# Patient Record
(demographics unavailable — no encounter records)

---

## 2024-10-07 NOTE — DISCUSSION/SUMMARY
[FreeTextEntry1] : 36-year-old G [id number] last menstrual cycle was September 8, 2024 presents with positive pregnancy test.  Bedside sonogram failed to show definitive intrauterine gestation and there was no adnexal mass.  Physical examination is normal.  GC chlamydia sent and quantitative beta-hCG ordered.  Return in 2 weeks for follow-up.  Diet and exercise reviewed.  Safe sex practices reviewed.

## 2024-10-07 NOTE — PHYSICAL EXAM
[Chaperone Present] : A chaperone was present in the examining room during all aspects of the physical examination [[id number]] : A chaperone was present during the pelvic exam. [FreeTextEntry2] : jeny [Appropriately responsive] : appropriately responsive [Alert] : alert [No Acute Distress] : no acute distress [No Lymphadenopathy] : no lymphadenopathy [Regular Rate Rhythm] : regular rate rhythm [No Murmurs] : no murmurs [Clear to Auscultation B/L] : clear to auscultation bilaterally [Soft] : soft [Non-tender] : non-tender [Non-distended] : non-distended [No HSM] : No HSM [No Lesions] : no lesions [No Mass] : no mass [Oriented x3] : oriented x3 [Examination Of The Breasts] : a normal appearance [No Masses] : no breast masses were palpable [Labia Majora] : normal [Labia Minora] : normal [Normal] : normal [Uterine Adnexae] : normal [Declined] : Patient declined rectal exam [FreeTextEntry5] : cervix not visualized, history of cone biopsy.Pap smear taken nonetheless.

## 2024-10-07 NOTE — HISTORY OF PRESENT ILLNESS
[N] : Patient does not use contraception [Y] : Positive pregnancy history [Regular Cycle Intervals] : periods have been regular [Frequency: Q ___ days] : menstrual periods occur approximately every [unfilled] days [Menarche Age: ____] : age at menarche was [unfilled] [FreeTextEntry1] : 36-year-old -0-1-2 last menstrual cycle was 2024 presents with positive pregnancy test.  Bedside sonogram failed to show definitive intrauterine gestation but no adnexal masses .  Patient complains of breast tenderness. [PGHxTotal] : 4 [[facility]] : 2 [PGHxPremature] : 0 [PGHxAbortions] : 0 [[facility]Living] : 2 [PGHxABInduced] : 0 [PGHxABSpont] : 0 [PGxEctopic] : 1 [PGHxMultBirths] : 0

## 2024-10-25 NOTE — DISCUSSION/SUMMARY
[FreeTextEntry1] : 36-year-old -0-1-2 last menstrual cycle was 2024 presents with positive pregnancy test and bedside sonogram shows intrauterine gestation with fetal heart.  Repeat Pap smear sent.  Patient on prenatal vitamins.  Sonogram ordered for dating.  Return in 2 weeks for follow-up.  All questions answered.

## 2024-10-25 NOTE — PHYSICAL EXAM
[Chaperone Present] : A chaperone was present in the examining room during all aspects of the physical examination [[id number]] : A chaperone was present during the pelvic exam. [FreeTextEntry2] : Leah [Appropriately responsive] : appropriately responsive [Alert] : alert [No Acute Distress] : no acute distress [No Lymphadenopathy] : no lymphadenopathy [Regular Rate Rhythm] : regular rate rhythm [No Murmurs] : no murmurs [Clear to Auscultation B/L] : clear to auscultation bilaterally [Soft] : soft [Non-tender] : non-tender [Non-distended] : non-distended [No HSM] : No HSM [No Lesions] : no lesions [No Mass] : no mass [Oriented x3] : oriented x3 [Examination Of The Breasts] : a normal appearance [No Masses] : no breast masses were palpable [Labia Majora] : normal [Labia Minora] : normal [Normal] : normal [Enlarged ___ wks] : enlarged [unfilled] ~Uweeks [Uterine Adnexae] : normal [Declined] : Patient declined rectal exam [FreeTextEntry5] : cervix not visualized, history of cone biopsy.Pap smear taken nonetheless.

## 2024-10-25 NOTE — HISTORY OF PRESENT ILLNESS
[FreeTextEntry1] : 36-year-old -0-1-2 last menstrual cycle was 2024 presents with positive pregnancy test and bedside sonogram shows intrauterine gestation with fetal heart.  No nausea vomiting or diarrhea.

## 2024-11-11 NOTE — REASON FOR VISIT
[FreeTextEntry1] : [facility] [place]. [phone] [address] [place] [address]

## 2024-11-11 NOTE — PROCEDURE
[Colposcopy] : colposcopy [Abnormal Pap] : an abnormal pap smear [Patient] : the patient [Verbal Consent] : verbal consent was obtained prior to the procedure and is detailed in the patient's record [No Abnormalities] : no abnormalities [Normal Staining] : normal staining [Biopsies Taken: # ___] : no biopsies were taken of the cervix [ECC Done] : an endocervical curettage was not performed [No Complications] : none [Tolerated Well] : the patient tolerated the procedure well [FreeTextEntry1] : No acetowhite changes no leukoplakia, no concern for dysplasia, normal exam. Transformation zone not clearly seen, but given pregnancy no ECC done. Low concern for high grade dysplasia. No atypical vessels seen. Recommend follow up after delivery.

## 2024-11-11 NOTE — HISTORY OF PRESENT ILLNESS
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 Hilliard 12/23 and she presented to discuss results. ECC and 3 o'clock biopsy with no evidence of dysplasia. She was last seen in January 2024 and I recommended a1 year PAP follow up. Given low grade changes and discrepancy is not that concerning. If there is persistent low grade PAP over 2 years and no correlation will consider LEEP.  Patient presents to the office today for an abnormal pap smear revealed LGSIL HPV DETECTED, HPV 16/18/45 not detected.   Patient is currently @ 13 weeks pregnant. She reports no vaginal fluid or discharge and no bleeding.

## 2024-11-11 NOTE — END OF VISIT
[FreeTextEntry3] : TVUS at 16 weeks to measure cervical length Follow up after delivery for repeat colposcopy  Follow up with Dr. Hilliard for pregnancy [FreeTextEntry2] : Alexandrea Hines MA was present the entire duration of the patient interaction and gynecological exam.

## 2024-11-11 NOTE — ASSESSMENT
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 Hilliard 12/23 and she presented to discuss results. ECC and 3 o'clock biopsy with no evidence of dysplasia. She is now @13 weeks with normal colposcopy today.  Short cervix on exam today at 13 weeks but closed. Recommend TVUS at 16 weeks and follow up with her OB/Gyn.   Follow up after delivery and will perform repeat colposcopy and biopsies.

## 2024-11-11 NOTE — REASON FOR VISIT
[FreeTextEntry1] : [facility] [place]. [phone] [address] [place] [address]

## 2024-11-11 NOTE — PHYSICAL EXAM
[Chaperone Present] : A chaperone was present in the examining room during all aspects of the physical examination [[id number]] : A chaperone was present during the pelvic exam. [FreeTextEntry2] : Alexandrea Hines MA was present the entire duration of the patient interaction and gynecological exam. [Normal] : Anus and perineum: Normal sphincter tone, no masses, no prolapse. [de-identified] : soft, non-tender [de-identified] : see colposcopy,  [de-identified] : closed cervix, short on palpation [Fully active, able to carry on all pre-disease performance without restriction] : Status 0 - Fully active, able to carry on all pre-disease performance without restriction

## 2024-11-11 NOTE — PHYSICAL EXAM
[Chaperone Present] : A chaperone was present in the examining room during all aspects of the physical examination [[id number]] : A chaperone was present during the pelvic exam. [FreeTextEntry2] : Alexandrea Hines MA was present the entire duration of the patient interaction and gynecological exam. [Normal] : Anus and perineum: Normal sphincter tone, no masses, no prolapse. [de-identified] : soft, non-tender [de-identified] : see colposcopy,  [de-identified] : closed cervix, short on palpation [Fully active, able to carry on all pre-disease performance without restriction] : Status 0 - Fully active, able to carry on all pre-disease performance without restriction

## 2025-01-09 NOTE — CHIEF COMPLAINT
[G ___] : G [unfilled] [P ___] : P [unfilled] [de-identified] : AMA, prior  x2, hx of LEEP/ cold knife biopsy and history of  death

## 2025-01-09 NOTE — DISCUSSION/SUMMARY
[FreeTextEntry1] : We had the pleasure of seeing Ms. Trevin Kimball for a Maternal-Fetal Medicine consultation today. 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 This condition primarily arises when the fetus inherits specific platelet antigens from the father that the mother does not possess, most commonly human platelet antigen (HPA)-1a. As the mother's immune system reacts against these paternal antigens, the resulting antibodies cross the placenta, binding to the fetal platelets and leading to their premature clearance. This condition poses severe risks, particularly intracranial hemorrhage (ICH), which can have detrimental outcomes for the , ranging from developmental delays to death. Platelet antigen incompatibility confirmed during current pregnancy. Patient currently receiving intravenous immunoglobulin (IVIG) 1 g/kg/week. Starting today, we also recommend prednisone 0.5 mg/kg/day. Prescription sent to pharmacy for 20 mg PO prednisone daily. Discussed risks/benefits with patient, including increased risk for diabetes during pregnancy. Recommend glucose testing in 2-3 weeks. Pre-labor  delivery should be performed to minimize risks, the timing of which depends on the patient's clinical history.  History of fetus with congenital heart defect. Fetal echocardiogram normal.  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  History of prior CS x2:  Her first  del[place] She had a repeat  in 2017. A prelabor repeat  delivery is recommended to reduce the chance of uterine rupture. Timing of delivery TBD.  Recommendation: - Continue IVIG and start prednisone   At the end of our discussion, the patient indicated that her questions were answered, and she seemed satisfied with our discussion. All her questions were answered Please do not hesitate to contact us with any questions.   Sincerely, Dexter Trivedi MD, Attending, Maternal-Fetal Medicine

## 2025-02-13 NOTE — CHIEF COMPLAINT
[G ___] : G [unfilled] [P ___] : P [unfilled] [de-identified] : AMA, prior  x2, hx of LEEP/ cold knife biopsy and history of  death

## 2025-02-13 NOTE — DISCUSSION/SUMMARY
[FreeTextEntry1] : We had the pleasure of seeing Ms. Trevin Kimball for a Maternal-Fetal Medicine consultation today. 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 This condition primarily arises when the fetus inherits specific platelet antigens from the father that the mother does not possess, most commonly human platelet antigen (HPA)-1a. As the mother's immune system reacts against these paternal antigens, the resulting antibodies cross the placenta, binding to the fetal platelets and leading to their premature clearance. This condition poses severe risks, particularly intracranial hemorrhage (ICH), which can have detrimental outcomes for the , ranging from developmental delays to death. Platelet antigen incompatibility confirmed during current pregnancy. Patient currently receiving intravenous immunoglobulin (IVIG) 1 g/kg/week and prednisone 0.5 mg/kg/day. Starting on 25, IVIG infusions will change from weekly to twice weekly. Pre-labor  delivery should be performed to minimize risks, the timing of which depends on the patient's clinical history. Per recent discussion at multidisciplinary meeting, delivery timing window of 35-38 weeks considered, and current target is 36 weeks.  History of fetus with congenital heart defect. Fetal echocardiogram normal.  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  History of prior CS x2:  Her first  delivery was for arrest of dilation or failed [place] She had a repeat  in 2017. A prelabor repeat  delivery is recommended to reduce the chance of uterine rupture. See timing discussion above.  Recommendation: - Continue IVIG and prednisone, begin twice weekly IVIG infusions on 25 - Repeat 1-hour GCT (specimen lost)--patient at high risk for diabetes given steroid exposure   At the end of our discussion, the patient indicated that her questions were answered, and she seemed satisfied with our discussion. All her questions were answered Please do not hesitate to contact us with any questions.   Sincerely, Dexter Trivedi MD, Attending, Maternal-Fetal Medicine

## 2025-03-13 NOTE — CHIEF COMPLAINT
[G ___] : G [unfilled] [P ___] : P [unfilled] [de-identified] : AMA, prior  x2, hx of LEEP/ cold knife biopsy and history of  death

## 2025-03-13 NOTE — DISCUSSION/SUMMARY
[FreeTextEntry1] : We had the pleasure of seeing Ms. Trevin Kimball for a Maternal-Fetal Medicine consultation today. 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 This condition primarily arises when the fetus inherits specific platelet antigens from the father that the mother does not possess, most commonly human platelet antigen (HPA)-1a. As the mother's immune system reacts against these paternal antigens, the resulting antibodies cross the placenta, binding to the fetal platelets and leading to their premature clearance. This condition poses severe risks, particularly intracranial hemorrhage (ICH), which can have detrimental outcomes for the , ranging from developmental delays to death. Platelet antigen incompatibility confirmed during current pregnancy. Patient currently receiving intravenous immunoglobulin (IVIG) 1 g/kg/week and prednisone 0.5 mg/kg/day. Starting on 25, IVIG infusions will change from weekly to twice weekly. Pre-labor  delivery should be performed to minimize risks, the timing of which depends on the patient's clinical history. Per recent discussion at multidisciplinary meeting, delivery timing of 36 weeks is the goal. Consulation with elsa benjamin to be scheduled.   History of fetus with congenital heart defect. Fetal echocardiogram normal.  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  History of prior CS x2:  Her first  delivery was [place] of [place] She had a repeat  in 2017. A prelabor repeat  delivery is recommended to reduce the chance of uterine rupture. See timing discussion above.  Recommendation: - Continue prednisone and twice weekly IVIG infusions, which will start occuring on L&D next week   At the end of our discussion, the patient indicated that her questions were answered, and she seemed satisfied with our discussion. All her questions were answered Please do not hesitate to contact us with any questions.   Sincerely, Dexter Trivedi MD, Attending, Maternal-Fetal Medicine

## 2025-04-03 NOTE — LETTER GREETING
Dear  ___] : Dear  [unfilled], [I had the pleasure of seeing your patient today.] : I had the pleasure of seeing your patient today

## 2025-04-03 NOTE — CHIEF COMPLAINT
[G ___] : G [unfilled] [P ___] : P [unfilled] [de-identified] : AMA, prior  x2, hx of LEEP/ cold knife biopsy and history of  death

## 2025-04-03 NOTE — DISCUSSION/SUMMARY
[FreeTextEntry1] : We had the pleasure of seeing Ms. Trevin Kimball for a Maternal-Fetal Medicine consultation today.   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 Patient currently receiving intravenous immunoglobulin (IVIG) 1 g/kg/week and prednisone 0.5 mg/kg/day. Receiving IVIG infusions twice weekly. Pre-labor  delivery should be performed to minimize risks, the timing of which depends on the patient's clinical history. Per recent discussion at multidisciplinary meeting, delivery recommended at 36 weeks   History of fetus with congenital heart defect. Fetal echocardiogram normal.  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  History of prior CS x2:  Her first  delivery was for arrest of dilation [place] She had a repeat  in 2017. A prelabor repeat  delivery is recommended to reduce the chance of uterine rupture. See timing discussion above.  Recommendation: - Continue prednisone and twice weekly IVIG infusions on L&D - Repeat  delivery scheduled at 36 weeks   At the end of our discussion, the patient indicated that her questions were answered, and she seemed satisfied with our discussion. All her questions were answered Please do not hesitate to contact us with any questions.   Sincerely, Dexter Trivedi MD, Attending, Maternal-Fetal Medicine

## 2025-04-28 NOTE — DISCUSSION/SUMMARY
[FreeTextEntry1] : 36-year-old -0-1-2 status post repeat  section on 2025 presents for postop checkup.  She was readmitted with preeclampsia with severe features and treated with magnesium sulfate.  She is feeling better and denies headaches epigastric pain or blurry vision.  She is presently on nifedipine 30 mg daily.  She is scheduled to see cardiologist in 2 days.  Return in 2 weeks for follow-up.

## 2025-04-28 NOTE — HISTORY OF PRESENT ILLNESS
[FreeTextEntry1] : 36-year-old -0-1-2 status post repeat  section on 2025 and was readmitted with preeclampsia with severe features and treated with magnesium sulfate.  She is presently on antihypertensive nifedipine 30 mg daily and is scheduled to see the cardiologist in 2 days.  Denies headaches or epigastric pain or blurry vision.  Blood pressure today is 124/86.

## 2025-04-28 NOTE — PHYSICAL EXAM
[Chaperoned Physical Exam] : A chaperone was present in the examining room during all aspects of the physical examination. [MA] : MA [FreeTextEntry2] : cody [FreeTextEntry7] : Abdominal incision is healing well.

## 2025-04-30 NOTE — REASON FOR VISIT
[Hypertension] : hypertension [Language Line ] : provided by [employer]   [Time Spent: ____ minutes] : Total time spent using  services: [unfilled] minutes. The patient's primary language is not English thus required  services. [FreeTextEntry3] : Dave Ingram MD-[address], [place] [address] [Interpreters_IDNumber] : [id number] [Interpreters_FullName] : Vicente Zimmerman [TWNoteComboBox1] : Barb[place] [FreeTextEntry1] : History was provided by patient and chart review. She was alone during the encounter.

## 2025-04-30 NOTE — DISCUSSION/SUMMARY
[EKG obtained to assist in diagnosis and management of assessed problem(s)] : EKG obtained to assist in diagnosis and management of assessed problem(s) [FreeTextEntry1] : Ms. Faith Evans is a 36-year-old  woman, denies smoking or using illicit drugs. Social alcohol use. 2 children. There is no evidence of acute decompensated heart failure, angina, clinically significant arrhythmia or valvular heart disease. NYHA Class I. No reported family history of premature CAD but had a child who  from congenital heart disease from what I can gather.  ECG sinus rhythm, LVH.   1. Preeclampsia with severe features in postpartum state. Goal BP<140/90 mm Hg. She is at goal on nifedipine 30 mg daily. I asked her to continue the drug for now. Stop the drug if BP <110/60 and call 911 if BP>160/100. We discussed that nifedipine is safe for breastfeeding. I think we should check lipids, A1c and Lp(a) when she returns to see me. We discussed PEC is associated with future CV disease. Encouraged her to continue breastfeeding.  2. LVH - recommend an echo.   I recommended lifestyle modifications for long-term cardiovascular health, including adopting a Mediterranean diet pattern to lower cardiovascular disease risk. I recommended at least 150 minutes per week of moderate-intensity exercise or 75 minutes of vigorous activity combined with muscle strengthening activities at least twice weekly. Exercise once cleared by OBGYN. I reviewed cardiac alarm symptoms with the patient and next steps if the patient experiences such symptoms.   I recommend a follow-up appointment with me in 3 mo. I will follow up on the echo. I will check labs at that visit.    The patient agreed with the above recommendations and was appreciative of the care provided. The patient should continue seeing their primary care physician for overall health maintenance. I am happy to answer any further questions so please call my office if needed.      Dexter Flores DO, [facility] Cardiologist Adult Congenital Heart Disease Cardiologist [address], [address] [place] [address] Office telephone number [phone]

## 2025-04-30 NOTE — CARDIOLOGY SUMMARY
[de-identified] : 2025 sinus tachycardia with ventricular rate of 105 bpm. AR interval 120 ms and QRS duration of 87 ms. Normal axis and intervals. QTc 412 ms. LVH  EC2022 SR LVH [de-identified] : Zio Patch 1/28/2022 to 1/30/2022  HR , 85 bpm Rare PACs No PVCs

## 2025-04-30 NOTE — HISTORY OF PRESENT ILLNESS
[FreeTextEntry1] : Ms. Faith Evans was seen at the [facility] located in [place], [place], on 2025. The patient is a 36-year-old woman with preeclampsia with severe features. She was referred specifically for BP management.  She is  post repeat  delivery 2025 was readmitted with preeclampsia with severe features and treated with magnesium sulfate. She is on nifedipine 30 mg daily. Of note, she saw a cardiologist on 2022 who ordered a heart monitor for palpitations.   She is trying to breastfeed.   Patient denies fevers, fatigue, snoring, lightheadedness, near syncope, syncope, headache, vision disturbance, chest pain, palpitations, dyspnea, edema, orthopnea, or PND. Family history: congenital heart disease in her son who passes away; HTN on maternal side. Social history: patient denies smoking or using illicit drugs. Social alcohol use. . 2 children per her report.  CV meds: nifedipine 30 mg QD. Home BPs 130s. Exercise: none. Diet: home cooked meals, 1-2 cups of coffee per day.

## 2025-04-30 NOTE — PHYSICAL EXAM
[Well Developed] : well developed [Normal Venous Pressure] : normal venous pressure [Normal S1, S2] : normal S1, S2 [No Murmur] : no murmur [No Rub] : no rub [No Gallop] : no gallop [Clear Lung Fields] : clear lung fields [Good Air Entry] : good air entry [No Respiratory Distress] : no respiratory distress  [No Edema] : no edema [No Cyanosis] : no cyanosis [No Clubbing] : no clubbing [Moves all extremities] : moves all extremities [Alert and Oriented] : alert and oriented [Normal memory] : normal memory [Tachycardia] : tachycardic [Heart Rate ___] : [unfilled] bpm [Rhythm Regular] : regular

## 2025-05-06 NOTE — PLAN
[FreeTextEntry1] : 36-year-old -0-1-2 status post repeat  section on 2025 and readmitted with preeclampsia with severe features.  She was discharged home on nifedipine which she is still taking and she is following up with the cardiologist.  She is to return in 4 weeks for follow-up.  All questions answered.

## 2025-05-06 NOTE — HISTORY OF PRESENT ILLNESS
[FreeTextEntry1] : 36-year-old -0-1-2 status post repeat  section in 2025 and was admitted with severe preeclampsia and treated with magnesium sulfate and discharged on nifedipine.  Blood pressure is normal and she is following up with the cardiologist.  No headaches no epigastric pain or blurry vision

## 2025-05-06 NOTE — PHYSICAL EXAM
[Chaperoned Physical Exam] : A chaperone was present in the examining room during all aspects of the physical examination. [MA] : MA [FreeTextEntry2] : shine [FreeTextEntry7] : Abdominal incision is healing well.

## 2025-05-13 NOTE — DISCUSSION/SUMMARY
[FreeTextEntry1] : 36-year-old -0-1-2 status post  delivery on 2025 presents with some vaginal bleeding which has stopped.  No symptoms syncopal episodes.  She gives history of preeclampsia with severe features.  Blood pressure today is 100/62.  Incision is healing well.  CBC ordered.  Return in 2 weeks for follow-up.

## 2025-05-13 NOTE — HISTORY OF PRESENT ILLNESS
[FreeTextEntry1] : 36-year-old -0-1-2 status post repeat  section on 2025 presents with some vaginal bleeding yesterday which has tapered off.  She is breast-feeding and denies dizziness or syncopal episode.

## 2025-05-28 NOTE — HISTORY OF PRESENT ILLNESS
[FreeTextEntry1] : 36-year-old -0-1-2 status post repeat  section on 2025 presented 2 weeks ago with some vaginal bleeding which has since stopped she is breast-feeding and denies pain or discharge.  She complains of occasional swelling but no headaches or epigastric pain.

## 2025-05-28 NOTE — DISCUSSION/SUMMARY
[FreeTextEntry1] : 36-year-old -0-1-2 status post repeat  section on 2025 presented with some vaginal bleeding and swelling about 2 weeks ago and the bleeding has subsided and the incision is healing well.  Blood pressure is normal.  Patient to return in 3 weeks for full examination and all questions answered.

## 2025-06-09 NOTE — END OF VISIT
[FreeTextEntry3] : Unable to perform exam, do not charge for visit Re-evaluate in 2 months  [FreeTextEntry2] : Ne Arnold MA was present the entire duration of the patient interaction and gynecological exam.

## 2025-06-09 NOTE — PHYSICAL EXAM
[Chaperoned Physical Exam] : A chaperone was present in the examining room during all aspects of the physical examination. [MA] : MA [FreeTextEntry2] : Ne Arnold MA was present the entire duration of the patient interaction and gynecological exam.  [Normal] : Bladder: Not distended, no tenderness [Abnormal] : Examination of vagina: Abnormal [de-identified] : soft, distended, but non-tender [Fully active, able to carry on all pre-disease performance without restriction] : Status 0 - Fully active, able to carry on all pre-disease performance without restriction [de-identified] : brown discharge in upper vagina, unable to see cervix

## 2025-06-09 NOTE — PHYSICAL EXAM
[Chaperoned Physical Exam] : A chaperone was present in the examining room during all aspects of the physical examination. [MA] : MA [FreeTextEntry2] : Ne Arnold MA was present the entire duration of the patient interaction and gynecological exam.  [Normal] : Bladder: Not distended, no tenderness [de-identified] : soft, distended, but non-tender [Abnormal] : Examination of vagina: Abnormal [Fully active, able to carry on all pre-disease performance without restriction] : Status 0 - Fully active, able to carry on all pre-disease performance without restriction [de-identified] : brown discharge in upper vagina, unable to see cervix

## 2025-06-09 NOTE — REASON FOR VISIT
[FreeTextEntry1] : [facility] [place]. [phone] [address] [place] [address]   Colposcopy

## 2025-06-09 NOTE — REASON FOR VISIT
[FreeTextEntry1] : [facility] [place]. [phone] [address] [place] [address]   Colposcopy

## 2025-06-09 NOTE — ASSESSMENT
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 Hilliard 12/23 and she presented to discuss results. Patient with discharge and cervix very high up in pelvis. Will re-evaluate in 2 months to see if exam improved but unable to perform colposcopy today.

## 2025-06-09 NOTE — HISTORY OF PRESENT ILLNESS
[FreeTextEntry1] : Pt is a 35 yo with LSIL, HPV positive who had colposcopy by Dr. Hilliard 12/23 and she presented to discuss results. ECC and 3 o'clock biopsy with no evidence of dysplasia. She was seen in January 2024 and I recommended a1 year PAP follow up. Given low grade changes and discrepancy is not that concerning. If there is persistent low grade PAP over 2 years and no correlation will consider LEEP.  Patient presented on 11/11/24 to the office for an abnormal pap smear revealed LGSIL HPV DETECTED, HPV 16/18/45 not detected. At the time patient was 13 weeks pregnant.   Colposcopy was wnl and she had short cervix. I recommended a TV/US at 16 weeks and for patient to follow up with her GYN.   She presents to the office today for a colposcopy.

## 2025-06-13 NOTE — PHYSICAL EXAM
[Chaperoned Physical Exam] : A chaperone was present in the examining room during all aspects of the physical examination. [MA] : MA [Appropriately responsive] : appropriately responsive [Alert] : alert [No Acute Distress] : no acute distress [No Lymphadenopathy] : no lymphadenopathy [Regular Rate Rhythm] : regular rate rhythm [No Murmurs] : no murmurs [Clear to Auscultation B/L] : clear to auscultation bilaterally [Soft] : soft [Non-tender] : non-tender [Non-distended] : non-distended [No HSM] : No HSM [No Lesions] : no lesions [No Mass] : no mass [Oriented x3] : oriented x3 [Examination Of The Breasts] : a normal appearance [No Masses] : no breast masses were palpable [Labia Majora] : normal [Labia Minora] : normal [Normal] : normal [Enlarged ___ wks] : enlarged [unfilled] ~Uweeks [Uterine Adnexae] : normal [Declined] : Patient declined rectal exam [FreeTextEntry2] : Leah [FreeTextEntry7] : Abdominal incision has healed well. [FreeTextEntry5] : cervix not visualized, history of cone biopsy.Pap smear taken nonetheless.

## 2025-06-13 NOTE — HISTORY OF PRESENT ILLNESS
[FreeTextEntry1] : 36-year-old -0-1-2 status post  section on 2025 presents for GYN evaluation.  History of abnormal Pap smear.  History of LEEP procedure.  She desires sterilization for contraception.

## 2025-06-13 NOTE — PLAN
[FreeTextEntry1] : 36-year-old -0-1-2 status post  section on 2025 presents for GYN evaluation.  She desires sterilization.  Gives history of abnormal Pap smear treated with LEEP procedure.  Physical examination is limited because cervix could not be properly visualized.  Pap GC chlamydia sent.  Return in 4 weeks for follow-up for scheduling for sterilization should she continue to desire it.

## 2025-06-19 NOTE — PHYSICAL EXAM
[FreeTextEntry3] : Gen: well developed, well nourished, well appearing, NAD, AAOx3 Focused skin exam per patient preference: - right dorsal hand with verrucous papule

## 2025-06-19 NOTE — HISTORY OF PRESENT ILLNESS
[FreeTextEntry1] : boil on hand for 2 months [de-identified] : 36 year F presents for boil on hand for 2 months. picks at it   delonte [id number]

## 2025-06-19 NOTE — ASSESSMENT
[FreeTextEntry1] : #wart, right dorsal hand - education, counseling, avoid picking, contagious - Cryotherapy; Location: right dorsal hand. Verbal informed consent obtained. Risks and benefits discussed including blister formation, hypo or hyperpigmentation and possible need for re-treatment and/or biopsy if not resolved. 2 rounds performed. Total freeze time 10 sec. Procedure well tolerated. Wound care reviewed. - start salicylic acid 40% plaster every 24 hr, apply wartstick at night and cover with duct tape, remove after 12 hr. sed  RTC 4- 6 weeks

## 2025-07-22 NOTE — PLAN
[FreeTextEntry1] : 36-year-old -0-1-2 desirous of sterilization, signed sterilization papers and will be scheduled at the ambulatory center.  Return to the office 2 weeks postop for follow-up.  All questions answered

## 2025-07-22 NOTE — HISTORY OF PRESENT ILLNESS
[FreeTextEntry1] : 36-year-old -0-1-2 presents for GYN evaluation.  She desires sterilization and understands that salpingectomies will be performed.  Other forms of contraception had been discussed.

## 2025-07-29 NOTE — DISCUSSION/SUMMARY
[EKG obtained to assist in diagnosis and management of assessed problem(s)] : EKG obtained to assist in diagnosis and management of assessed problem(s) [FreeTextEntry1] : Ms. Faith Evans is a 36-year-old woman. There is no evidence of acute decompensated heart failure, angina, clinically significant arrhythmia or valvular heart disease. NYHA Class I. Patient is a non-smoker. Patient has no known chronic HTN, DM or HLD.   1. Preeclampsia with severe features in postpartum state. Goal BP<140/90 mm Hg. She is at goal on nifedipine 30 mg daily. I asked her to stop the drug today. She knows to call 911 if BP>160/110.  Check lipids, A1c and Lp(a) today. We discussed PEC is associated with future CV disease. She tells me that she wants a tubal ligation in the future.  2. LVH on a prior ECG. ECG today sinus rhythm, no LVH. TTE 5/2025 normal left ventricular systolic function with an ejection fraction of 60%, no significant valvular abnormalities by Doppler, and no pericardial effusion.  I recommended lifestyle modifications for long-term cardiovascular health. I reviewed cardiac alarm symptoms with the patient and next steps if the patient experiences such symptoms. I advised her to see her PCP regarding right knee pain, and she verbalized understanding.   I recommend a follow-up appointment with me PRN. I will follow up on the labs.  The patient agreed with the above recommendations and was appreciative of the care provided. The patient should continue seeing their primary care physician for overall health maintenance. I am happy to answer any further questions so please call my office if needed.   Dexter Flores DO, [facility] Cardiologist Adult Congenital Heart Disease Cardiologist [address], [place] [address] Office telephone number [phone].

## 2025-07-29 NOTE — CARDIOLOGY SUMMARY
[de-identified] : 7/29/25 sinus rhythm with ventricular rate of 81 bpm. MT interval 130 ms and QRS duration of 88 ms. Normal axis and intervals. QTc 431 ms. No chamber enlargement or hypertrophy. No ST/T changes.  [de-identified] : Zio Patch  1/28/2022 to 1/30/2022 HR , 85 bpm Rare PACs No PVCs [de-identified] : TTE 5/15/25 1. Left ventricular cavity is normal in size. Left ventricular wall thickness is normal. Left ventricular systolic function is normal with an ejection fraction visually estimated at 55 to 60 %. 2. The valves were not well visualized with the exception of the mitral valve. There are no valvular abnormalities based on Doppler. 3. Normal left ventricular diastolic function, with normal left ventricular filling pressure. 4. Normal right ventricular cavity size and normal right ventricular systolic function. 5. No pericardial effusion seen. 6. No prior echocardiogram is available for comparison.

## 2025-07-29 NOTE — PHYSICAL EXAM
[Well Developed] : well developed [Well Nourished] : well nourished [Obese] : obese [No Carotid Bruit] : no carotid bruit [Normal S1, S2] : normal S1, S2 [No Murmur] : no murmur [No Gallop] : no gallop [Normal Rate] : normal [Rhythm Regular] : regular [Clear Lung Fields] : clear lung fields [Good Air Entry] : good air entry [No Edema] : no edema [No Clubbing] : no clubbing [Moves all extremities] : moves all extremities [Normal memory] : normal memory

## 2025-07-29 NOTE — REASON FOR VISIT
[Hypertension] : hypertension [Language Line ] : provided by [employer]   [Time Spent: ____ minutes] : Total time spent using  services: [unfilled] minutes. The patient's primary language is not English thus required  services. [FreeTextEntry3] : Dave Ingram MD-[address], [place] [address] [Interpreters_IDNumber] : [id number] [Interpreters_FullName] : Riki [TWNoteComboBox1] : [place] [FreeTextEntry1] : History was provided by patient and chart review. She was alone during the visit.

## 2025-07-29 NOTE — HISTORY OF PRESENT ILLNESS
[FreeTextEntry1] : Ms. Faith Evans is a 36-year-old woman with preeclampsia with severe features. She was last evaluated by me on 2025. I recommended 3 month follow up with echo and labs. She had LVH on the ECG.  As a review, she is  post repeat  delivery 2025 was readmitted with preeclampsia with severe features and treated with magnesium sulfate. She is on nifedipine 30 mg daily. Of note, she saw a cardiologist on 2022 who ordered a heart monitor for palpitations. She denies fevers, fatigue, snoring, lightheadedness, near syncope, syncope, headache, vision disturbance, chest pain, palpitations, dyspnea, edema, orthopnea, or PND. She does have right knee pain.   Family history: congenital heart disease in her son who passed away; HTN on maternal side. Social history: patient denies smoking or using illicit drugs. Social alcohol use. . 2 children per her report. CV meds: nifedipine 30 mg QD. Home BPs 120s. Exercise: none. Diet: home cooked meals, 1-2 cups of coffee per day.